# Patient Record
Sex: MALE | Race: WHITE | Employment: FULL TIME | URBAN - NONMETROPOLITAN AREA
[De-identification: names, ages, dates, MRNs, and addresses within clinical notes are randomized per-mention and may not be internally consistent; named-entity substitution may affect disease eponyms.]

---

## 2023-03-03 PROBLEM — F41.9 ANXIETY DISORDER: Status: ACTIVE | Noted: 2023-03-03

## 2023-03-03 PROBLEM — G56.01 CARPAL TUNNEL SYNDROME OF RIGHT WRIST: Status: ACTIVE | Noted: 2023-03-03

## 2023-03-03 PROBLEM — M54.50 LOW BACK PAIN: Status: ACTIVE | Noted: 2023-03-03

## 2023-03-03 PROBLEM — M79.671 RIGHT FOOT PAIN: Status: ACTIVE | Noted: 2023-03-03

## 2023-03-03 PROBLEM — Q18.1 CYST ON EAR: Status: ACTIVE | Noted: 2023-03-03

## 2023-03-03 PROBLEM — M76.52 PATELLAR TENDINITIS OF LEFT KNEE: Status: ACTIVE | Noted: 2023-03-03

## 2023-03-03 RX ORDER — VENLAFAXINE HYDROCHLORIDE 150 MG/1
1 CAPSULE, EXTENDED RELEASE ORAL
COMMUNITY
Start: 2016-06-01 | End: 2023-04-11 | Stop reason: SDUPTHER

## 2023-03-03 RX ORDER — ADAPALENE 1 MG/G
GEL TOPICAL
COMMUNITY

## 2023-03-03 RX ORDER — LORAZEPAM 0.5 MG/1
1 TABLET ORAL DAILY PRN
COMMUNITY
Start: 2015-04-14

## 2023-04-11 DIAGNOSIS — F41.1 GENERALIZED ANXIETY DISORDER: ICD-10-CM

## 2023-04-11 RX ORDER — VENLAFAXINE HYDROCHLORIDE 150 MG/1
150 CAPSULE, EXTENDED RELEASE ORAL
Qty: 30 CAPSULE | Refills: 2 | Status: SHIPPED | OUTPATIENT
Start: 2023-04-11 | End: 2023-07-21

## 2023-04-13 ENCOUNTER — OFFICE VISIT (OUTPATIENT)
Dept: PRIMARY CARE | Facility: CLINIC | Age: 33
End: 2023-04-13
Payer: COMMERCIAL

## 2023-04-13 VITALS
DIASTOLIC BLOOD PRESSURE: 79 MMHG | SYSTOLIC BLOOD PRESSURE: 115 MMHG | OXYGEN SATURATION: 94 % | HEART RATE: 105 BPM | WEIGHT: 315 LBS | HEIGHT: 71 IN | TEMPERATURE: 96.3 F | BODY MASS INDEX: 44.1 KG/M2 | RESPIRATION RATE: 14 BRPM

## 2023-04-13 DIAGNOSIS — F41.1 GENERALIZED ANXIETY DISORDER: ICD-10-CM

## 2023-04-13 DIAGNOSIS — Z23 IMMUNIZATION DUE: Primary | ICD-10-CM

## 2023-04-13 PROCEDURE — 90471 IMMUNIZATION ADMIN: CPT | Performed by: FAMILY MEDICINE

## 2023-04-13 PROCEDURE — 90651 9VHPV VACCINE 2/3 DOSE IM: CPT | Performed by: FAMILY MEDICINE

## 2023-04-13 PROCEDURE — 99214 OFFICE O/P EST MOD 30 MIN: CPT | Performed by: FAMILY MEDICINE

## 2023-04-13 ASSESSMENT — ANXIETY QUESTIONNAIRES
4. TROUBLE RELAXING: NOT AT ALL
3. WORRYING TOO MUCH ABOUT DIFFERENT THINGS: NOT AT ALL
5. BEING SO RESTLESS THAT IT IS HARD TO SIT STILL: NOT AT ALL
2. NOT BEING ABLE TO STOP OR CONTROL WORRYING: NOT AT ALL
6. BECOMING EASILY ANNOYED OR IRRITABLE: NOT AT ALL
7. FEELING AFRAID AS IF SOMETHING AWFUL MIGHT HAPPEN: NOT AT ALL
1. FEELING NERVOUS, ANXIOUS, OR ON EDGE: NOT AT ALL
IF YOU CHECKED OFF ANY PROBLEMS ON THIS QUESTIONNAIRE, HOW DIFFICULT HAVE THESE PROBLEMS MADE IT FOR YOU TO DO YOUR WORK, TAKE CARE OF THINGS AT HOME, OR GET ALONG WITH OTHER PEOPLE: NOT DIFFICULT AT ALL
GAD7 TOTAL SCORE: 0

## 2023-04-13 ASSESSMENT — PATIENT HEALTH QUESTIONNAIRE - PHQ9
SUM OF ALL RESPONSES TO PHQ9 QUESTIONS 1 AND 2: 0
1. LITTLE INTEREST OR PLEASURE IN DOING THINGS: NOT AT ALL
2. FEELING DOWN, DEPRESSED OR HOPELESS: NOT AT ALL

## 2023-04-13 ASSESSMENT — PAIN SCALES - GENERAL: PAINLEVEL: 0-NO PAIN

## 2023-04-13 NOTE — PROGRESS NOTES
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    HPI   Bam was seen today for 6-month follow-up of his anxiety, for which he takes Effexor Exar 150 mg daily.  He is comfortable with this regimen, wishes for no change.  He has no side effects, including digestive, sweats, sexual, other.    His blood pressure remains excellent, takes no other routine medicines.  His lorazepam is taken extremely infrequently, perhaps once per year at most.  Labs were checked in October, reviewed today, all reassuring.  Review of Systems  The full, 10+ multi-organ review of systems, is within normal limits with the exception of what is noted above in HPI.  Objective   /79 (BP Location: Right arm, Patient Position: Sitting, BP Cuff Size: Adult)   Pulse 105   Temp 35.7 °C (96.3 °F) (Temporal)   Resp 14   Ht 1.803 m (5' 11\")   Wt (!) 157 kg (346 lb 9.6 oz)   SpO2 94%   BMI 48.34 kg/m²     Physical Exam  Cardiac exam reveals a regular rate and rhythm, no murmurs, rubs or gallops present.   Lungs are clear bilaterally.    No lower extremity edema present.  Constitutional/General appearance: alert, oriented, well-appearing, in no distress  Head and face exam is normal  No scleral icterus or conjunctival erythema present  Hearing is grossly normal  Respiratory effort is normal, no dyspnea noted  Cortical function is normal  Mood, affect, are pleasant, appropriate, and interactive.  Insight is normal      Assessment/Plan     Anxiety----we will continue the current regimen, including medications, as noted above.  Refills were provided, as needed.  I recommend continuing to work on a healthy diet, regular exercise, and minimizing caffeine and alcohol.  Good sleep hygiene is encouraged.  Follow up will be in 6 months, or sooner should the need arise  Lorazepam is taken scarcely, as little as a couple a year.       "

## 2023-06-14 ENCOUNTER — APPOINTMENT (OUTPATIENT)
Dept: PRIMARY CARE | Facility: CLINIC | Age: 33
End: 2023-06-14
Payer: COMMERCIAL

## 2023-07-20 DIAGNOSIS — F41.1 GENERALIZED ANXIETY DISORDER: ICD-10-CM

## 2023-07-21 DIAGNOSIS — F41.1 GENERALIZED ANXIETY DISORDER: ICD-10-CM

## 2023-07-21 RX ORDER — VENLAFAXINE HYDROCHLORIDE 150 MG/1
150 CAPSULE, EXTENDED RELEASE ORAL DAILY
Qty: 90 CAPSULE | Refills: 3 | Status: SHIPPED | OUTPATIENT
Start: 2023-07-21 | End: 2024-04-17 | Stop reason: SDUPTHER

## 2023-07-21 RX ORDER — VENLAFAXINE HYDROCHLORIDE 150 MG/1
150 CAPSULE, EXTENDED RELEASE ORAL DAILY
Qty: 90 CAPSULE | Refills: 3 | Status: SHIPPED | OUTPATIENT
Start: 2023-07-21 | End: 2023-07-21 | Stop reason: SDUPTHER

## 2023-10-13 ENCOUNTER — OFFICE VISIT (OUTPATIENT)
Dept: PRIMARY CARE | Facility: CLINIC | Age: 33
End: 2023-10-13
Payer: COMMERCIAL

## 2023-10-13 ENCOUNTER — LAB (OUTPATIENT)
Dept: LAB | Facility: LAB | Age: 33
End: 2023-10-13
Payer: COMMERCIAL

## 2023-10-13 VITALS
RESPIRATION RATE: 16 BRPM | TEMPERATURE: 96.5 F | DIASTOLIC BLOOD PRESSURE: 83 MMHG | OXYGEN SATURATION: 96 % | HEART RATE: 87 BPM | WEIGHT: 315 LBS | BODY MASS INDEX: 48.59 KG/M2 | SYSTOLIC BLOOD PRESSURE: 123 MMHG

## 2023-10-13 DIAGNOSIS — Z51.81 MEDICATION MONITORING ENCOUNTER: ICD-10-CM

## 2023-10-13 DIAGNOSIS — F41.1 GENERALIZED ANXIETY DISORDER: ICD-10-CM

## 2023-10-13 DIAGNOSIS — Z23 IMMUNIZATION DUE: ICD-10-CM

## 2023-10-13 PROBLEM — L91.8 OTHER HYPERTROPHIC DISORDERS OF THE SKIN: Status: ACTIVE | Noted: 2017-12-06

## 2023-10-13 PROCEDURE — 36415 COLL VENOUS BLD VENIPUNCTURE: CPT

## 2023-10-13 PROCEDURE — 83525 ASSAY OF INSULIN: CPT

## 2023-10-13 PROCEDURE — 99214 OFFICE O/P EST MOD 30 MIN: CPT | Performed by: FAMILY MEDICINE

## 2023-10-13 PROCEDURE — 80053 COMPREHEN METABOLIC PANEL: CPT

## 2023-10-13 PROCEDURE — 80061 LIPID PANEL: CPT

## 2023-10-13 PROCEDURE — 84443 ASSAY THYROID STIM HORMONE: CPT

## 2023-10-13 PROCEDURE — 3008F BODY MASS INDEX DOCD: CPT | Performed by: FAMILY MEDICINE

## 2023-10-13 PROCEDURE — 83036 HEMOGLOBIN GLYCOSYLATED A1C: CPT

## 2023-10-13 PROCEDURE — 85025 COMPLETE CBC W/AUTO DIFF WBC: CPT

## 2023-10-13 ASSESSMENT — ANXIETY QUESTIONNAIRES
1. FEELING NERVOUS, ANXIOUS, OR ON EDGE: SEVERAL DAYS
4. TROUBLE RELAXING: NOT AT ALL
5. BEING SO RESTLESS THAT IT IS HARD TO SIT STILL: NOT AT ALL
3. WORRYING TOO MUCH ABOUT DIFFERENT THINGS: NOT AT ALL
GAD7 TOTAL SCORE: 1
6. BECOMING EASILY ANNOYED OR IRRITABLE: NOT AT ALL
IF YOU CHECKED OFF ANY PROBLEMS ON THIS QUESTIONNAIRE, HOW DIFFICULT HAVE THESE PROBLEMS MADE IT FOR YOU TO DO YOUR WORK, TAKE CARE OF THINGS AT HOME, OR GET ALONG WITH OTHER PEOPLE: NOT DIFFICULT AT ALL
2. NOT BEING ABLE TO STOP OR CONTROL WORRYING: NOT AT ALL
7. FEELING AFRAID AS IF SOMETHING AWFUL MIGHT HAPPEN: NOT AT ALL

## 2023-10-13 ASSESSMENT — PATIENT HEALTH QUESTIONNAIRE - PHQ9
2. FEELING DOWN, DEPRESSED OR HOPELESS: NOT AT ALL
SUM OF ALL RESPONSES TO PHQ9 QUESTIONS 1 AND 2: 0
1. LITTLE INTEREST OR PLEASURE IN DOING THINGS: NOT AT ALL

## 2023-10-13 ASSESSMENT — PAIN SCALES - GENERAL: PAINLEVEL: 0-NO PAIN

## 2023-10-13 NOTE — PROGRESS NOTES
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    HPI   Bam was seen today for a routine follow-up of his Effexor, which he takes for anxiety.  He has been on it for many years, continues to tolerate it well, is satisfied with its efficacy, wishes for no change.    He is fasting for blood work today, last checked a year ago, sugar was normal, lipids have been stable.  On average, he drinks about 12 beers per week, sometimes a bit more.  Last ALT was 71, AST 40  Review of Systems  The full, 10+ multi-organ review of systems, is within normal limits with the exception of what is noted above in HPI.  Objective   /83 (BP Location: Right arm, Patient Position: Sitting, BP Cuff Size: Large adult)   Pulse 87   Temp 35.8 °C (96.5 °F) (Temporal)   Resp 16   Wt (!) 158 kg (348 lb 6.4 oz)   SpO2 96%   BMI 48.59 kg/m²     Physical Exam  Cardiac exam reveals a regular rate and rhythm, no murmurs, rubs or gallops present.   Lungs are clear bilaterally.    No lower extremity edema present.  Constitutional/General appearance: alert, oriented, well-appearing, in no distress  Head and face exam is normal  No scleral icterus or conjunctival erythema present  Hearing is grossly normal  Respiratory effort is normal, no dyspnea noted  Cortical function is normal  Mood, affect, are pleasant, appropriate, and interactive.  Insight is normal    Assessment/Plan     Anxiety----we will continue the current regimen, including medications, as noted above.  Refills were provided, as needed.  I recommend continuing to work on a healthy diet, regular exercise, and minimizing caffeine and alcohol.  Good sleep hygiene is encouraged.  Follow up will be in 6 months, or sooner should the need arise    Check full fasting labs, discussed mildly elevated ALT, very likely due to fatty liver changes, as well as alcohol intake.    Follow-up in 6 months  **Portions of this medical record have been created  using voice recognition software and may have minor errors which are inherent in voice recognition systems. It has not been fully edited for typographical or grammatical errors**

## 2023-10-14 LAB
ALBUMIN SERPL BCP-MCNC: 4.6 G/DL (ref 3.4–5)
ALP SERPL-CCNC: 101 U/L (ref 33–120)
ALT SERPL W P-5'-P-CCNC: 54 U/L (ref 10–52)
ANION GAP SERPL CALC-SCNC: 14 MMOL/L (ref 10–20)
AST SERPL W P-5'-P-CCNC: 28 U/L (ref 9–39)
BASOPHILS # BLD AUTO: 0.05 X10*3/UL (ref 0–0.1)
BASOPHILS NFR BLD AUTO: 0.8 %
BILIRUB SERPL-MCNC: 0.8 MG/DL (ref 0–1.2)
BUN SERPL-MCNC: 15 MG/DL (ref 6–23)
CALCIUM SERPL-MCNC: 9.8 MG/DL (ref 8.6–10.6)
CHLORIDE SERPL-SCNC: 105 MMOL/L (ref 98–107)
CHOLEST SERPL-MCNC: 190 MG/DL (ref 0–199)
CHOLESTEROL/HDL RATIO: 4.5
CO2 SERPL-SCNC: 27 MMOL/L (ref 21–32)
CREAT SERPL-MCNC: 0.83 MG/DL (ref 0.5–1.3)
EOSINOPHIL # BLD AUTO: 0.11 X10*3/UL (ref 0–0.7)
EOSINOPHIL NFR BLD AUTO: 1.7 %
ERYTHROCYTE [DISTWIDTH] IN BLOOD BY AUTOMATED COUNT: 12.6 % (ref 11.5–14.5)
EST. AVERAGE GLUCOSE BLD GHB EST-MCNC: 108 MG/DL
GFR SERPL CREATININE-BSD FRML MDRD: >90 ML/MIN/1.73M*2
GLUCOSE SERPL-MCNC: 94 MG/DL (ref 74–99)
HBA1C MFR BLD: 5.4 %
HCT VFR BLD AUTO: 45.7 % (ref 41–52)
HDLC SERPL-MCNC: 42.4 MG/DL
HGB BLD-MCNC: 15.2 G/DL (ref 13.5–17.5)
IMM GRANULOCYTES # BLD AUTO: 0.02 X10*3/UL (ref 0–0.7)
IMM GRANULOCYTES NFR BLD AUTO: 0.3 % (ref 0–0.9)
INSULIN P FAST SERPL-ACNC: 18 UIU/ML (ref 3–25)
LDLC SERPL CALC-MCNC: 125 MG/DL
LYMPHOCYTES # BLD AUTO: 2.3 X10*3/UL (ref 1.2–4.8)
LYMPHOCYTES NFR BLD AUTO: 34.9 %
MCH RBC QN AUTO: 31.5 PG (ref 26–34)
MCHC RBC AUTO-ENTMCNC: 33.3 G/DL (ref 32–36)
MCV RBC AUTO: 95 FL (ref 80–100)
MONOCYTES # BLD AUTO: 0.54 X10*3/UL (ref 0.1–1)
MONOCYTES NFR BLD AUTO: 8.2 %
NEUTROPHILS # BLD AUTO: 3.57 X10*3/UL (ref 1.2–7.7)
NEUTROPHILS NFR BLD AUTO: 54.1 %
NON HDL CHOLESTEROL: 148 MG/DL (ref 0–149)
NRBC BLD-RTO: 0 /100 WBCS (ref 0–0)
PLATELET # BLD AUTO: 342 X10*3/UL (ref 150–450)
PMV BLD AUTO: 9.2 FL (ref 7.5–11.5)
POTASSIUM SERPL-SCNC: 4.5 MMOL/L (ref 3.5–5.3)
PROT SERPL-MCNC: 7.3 G/DL (ref 6.4–8.2)
RBC # BLD AUTO: 4.82 X10*6/UL (ref 4.5–5.9)
SODIUM SERPL-SCNC: 141 MMOL/L (ref 136–145)
TRIGL SERPL-MCNC: 113 MG/DL (ref 0–149)
TSH SERPL-ACNC: 3.34 MIU/L (ref 0.44–3.98)
VLDL: 23 MG/DL (ref 0–40)
WBC # BLD AUTO: 6.6 X10*3/UL (ref 4.4–11.3)

## 2023-10-14 NOTE — RESULT ENCOUNTER NOTE
Cholesterol panel is reasonable, LDL/bad chol is mildly elevated, and one liver enzyme is a trace above normal.  Decrease alcohol further  Sugar, blood counts are within normal limits

## 2024-03-26 ENCOUNTER — HOSPITAL ENCOUNTER (OUTPATIENT)
Dept: RADIOLOGY | Facility: CLINIC | Age: 34
Discharge: HOME | End: 2024-03-26
Payer: COMMERCIAL

## 2024-03-26 ENCOUNTER — OFFICE VISIT (OUTPATIENT)
Dept: PRIMARY CARE | Facility: CLINIC | Age: 34
End: 2024-03-26
Payer: COMMERCIAL

## 2024-03-26 VITALS
RESPIRATION RATE: 16 BRPM | SYSTOLIC BLOOD PRESSURE: 111 MMHG | OXYGEN SATURATION: 96 % | WEIGHT: 315 LBS | DIASTOLIC BLOOD PRESSURE: 76 MMHG | HEART RATE: 85 BPM | BODY MASS INDEX: 48.4 KG/M2 | TEMPERATURE: 98.1 F

## 2024-03-26 DIAGNOSIS — M79.671 RIGHT FOOT PAIN: ICD-10-CM

## 2024-03-26 DIAGNOSIS — M79.671 RIGHT FOOT PAIN: Primary | ICD-10-CM

## 2024-03-26 PROCEDURE — 99213 OFFICE O/P EST LOW 20 MIN: CPT | Performed by: FAMILY MEDICINE

## 2024-03-26 PROCEDURE — 3008F BODY MASS INDEX DOCD: CPT | Performed by: FAMILY MEDICINE

## 2024-03-26 PROCEDURE — 73630 X-RAY EXAM OF FOOT: CPT | Mod: RT

## 2024-03-26 PROCEDURE — 73630 X-RAY EXAM OF FOOT: CPT | Mod: RIGHT SIDE | Performed by: RADIOLOGY

## 2024-03-26 NOTE — ASSESSMENT & PLAN NOTE
Ongoing pain for about 6 days.  Consider purchasing a boot to immobilize the foot.  Can take up to 800 mg Ibuprofen every 4-6 hours for pain, with food to avoid GI upset.  Continue ice as much as possible.  Try to avoid weight-bearing.   Possible stress fracture, could take 4-6 weeks to heal.  Order placed for x-ray.

## 2024-03-26 NOTE — PROGRESS NOTES
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    Patient has been having right foot pain and swelling for about 6 days.   He has been walking on it so it has been getting worse.   Icing it at time.   No injury that he is aware off.       He has had problems with this foot in the past and has seen podiatry, last time was back in 2018. He does not recall any injury but he does try to stay active by walking on a treadmill. He does not have any underlying health conditions. He does have a boot at home.         Review of Systems   Musculoskeletal:         Right foot pain       Objective   /76 (BP Location: Left arm, Patient Position: Sitting, BP Cuff Size: Large adult)   Pulse 85   Temp 36.7 °C (98.1 °F) (Temporal)   Resp 16   Wt (!) 157 kg (347 lb)   SpO2 96%   BMI 48.40 kg/m²     Physical Exam  Musculoskeletal:      Right foot: Swelling and bony tenderness present.      Comments: Tender prox 4th MT and distal 3rd MT  Redness and swelling concentrated at prox 3-4 MT area  Pitting swelling at this location           Assessment/Plan   Problem List Items Addressed This Visit       Right foot pain - Primary     Ongoing pain for about 6 days.  Consider purchasing a boot to immobilize the foot.  Can take up to 800 mg Ibuprofen every 4-6 hours for pain, with food to avoid GI upset.  Continue ice as much as possible.  Try to avoid weight-bearing.   Possible stress fracture, could take 4-6 weeks to heal.  Order placed for x-ray.         Relevant Orders    XR foot right 3+ views       Scribe Attestation  By signing my name below, I, Trisha Eubanks , Darrell   attest that this documentation has been prepared under the direction and in the presence of Alejandro Carreon MD.

## 2024-03-29 DIAGNOSIS — M79.671 RIGHT FOOT PAIN: Primary | ICD-10-CM

## 2024-03-29 DIAGNOSIS — D16.9 OSTEOCHONDROMA: ICD-10-CM

## 2024-04-04 ENCOUNTER — TELEPHONE (OUTPATIENT)
Dept: PRIMARY CARE | Facility: CLINIC | Age: 34
End: 2024-04-04
Payer: COMMERCIAL

## 2024-04-04 NOTE — TELEPHONE ENCOUNTER
Patient called in said that [facility] did not receive our fax for his referral for his foot. I explained that it was sent, but would send again.    I faxed to [phone] [facility] in [place].

## 2024-04-17 ENCOUNTER — OFFICE VISIT (OUTPATIENT)
Dept: PRIMARY CARE | Facility: CLINIC | Age: 34
End: 2024-04-17
Payer: COMMERCIAL

## 2024-04-17 VITALS
BODY MASS INDEX: 48.45 KG/M2 | SYSTOLIC BLOOD PRESSURE: 121 MMHG | WEIGHT: 315 LBS | HEART RATE: 85 BPM | TEMPERATURE: 97.7 F | OXYGEN SATURATION: 96 % | DIASTOLIC BLOOD PRESSURE: 82 MMHG

## 2024-04-17 DIAGNOSIS — M54.50 CHRONIC BILATERAL LOW BACK PAIN WITHOUT SCIATICA: Primary | ICD-10-CM

## 2024-04-17 DIAGNOSIS — G89.29 CHRONIC BILATERAL LOW BACK PAIN WITHOUT SCIATICA: Primary | ICD-10-CM

## 2024-04-17 DIAGNOSIS — M79.671 RIGHT FOOT PAIN: ICD-10-CM

## 2024-04-17 DIAGNOSIS — Z51.81 MEDICATION MONITORING ENCOUNTER: ICD-10-CM

## 2024-04-17 DIAGNOSIS — F41.1 GENERALIZED ANXIETY DISORDER: ICD-10-CM

## 2024-04-17 PROCEDURE — 99214 OFFICE O/P EST MOD 30 MIN: CPT | Performed by: FAMILY MEDICINE

## 2024-04-17 RX ORDER — VENLAFAXINE HYDROCHLORIDE 150 MG/1
150 CAPSULE, EXTENDED RELEASE ORAL DAILY
Qty: 30 CAPSULE | Refills: 11 | Status: SHIPPED | OUTPATIENT
Start: 2024-04-17

## 2024-04-17 ASSESSMENT — PATIENT HEALTH QUESTIONNAIRE - PHQ9
7. TROUBLE CONCENTRATING ON THINGS, SUCH AS READING THE NEWSPAPER OR WATCHING TELEVISION: NOT AT ALL
8. MOVING OR SPEAKING SO SLOWLY THAT OTHER PEOPLE COULD HAVE NOTICED. OR THE OPPOSITE, BEING SO FIGETY OR RESTLESS THAT YOU HAVE BEEN MOVING AROUND A LOT MORE THAN USUAL: NOT AT ALL
6. FEELING BAD ABOUT YOURSELF - OR THAT YOU ARE A FAILURE OR HAVE LET YOURSELF OR YOUR FAMILY DOWN: NOT AT ALL
SUM OF ALL RESPONSES TO PHQ9 QUESTIONS 1 AND 2: 0
2. FEELING DOWN, DEPRESSED OR HOPELESS: NOT AT ALL
5. POOR APPETITE OR OVEREATING: NOT AT ALL
9. THOUGHTS THAT YOU WOULD BE BETTER OFF DEAD, OR OF HURTING YOURSELF: NOT AT ALL
3. TROUBLE FALLING OR STAYING ASLEEP OR SLEEPING TOO MUCH: NOT AT ALL
4. FEELING TIRED OR HAVING LITTLE ENERGY: SEVERAL DAYS
1. LITTLE INTEREST OR PLEASURE IN DOING THINGS: NOT AT ALL
SUM OF ALL RESPONSES TO PHQ QUESTIONS 1-9: 1

## 2024-04-17 ASSESSMENT — ANXIETY QUESTIONNAIRES
5. BEING SO RESTLESS THAT IT IS HARD TO SIT STILL: NOT AT ALL
7. FEELING AFRAID AS IF SOMETHING AWFUL MIGHT HAPPEN: NOT AT ALL
3. WORRYING TOO MUCH ABOUT DIFFERENT THINGS: NOT AT ALL
1. FEELING NERVOUS, ANXIOUS, OR ON EDGE: SEVERAL DAYS
2. NOT BEING ABLE TO STOP OR CONTROL WORRYING: NOT AT ALL
GAD7 TOTAL SCORE: 1
4. TROUBLE RELAXING: NOT AT ALL
6. BECOMING EASILY ANNOYED OR IRRITABLE: NOT AT ALL
IF YOU CHECKED OFF ANY PROBLEMS ON THIS QUESTIONNAIRE, HOW DIFFICULT HAVE THESE PROBLEMS MADE IT FOR YOU TO DO YOUR WORK, TAKE CARE OF THINGS AT HOME, OR GET ALONG WITH OTHER PEOPLE: NOT DIFFICULT AT ALL

## 2024-04-17 NOTE — PROGRESS NOTES
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 Pt states he has no new issues to discuss today, ).    HPI   Ministerio was seen today for a 6-month follow-up of his anxiety medication.  He is taking and tolerating 150 mg of extended release Effexor, has been on this medication for years.  He tolerates it well, has no concerns regarding it, wishes to continue this regimen.  He takes lorazepam about 1-2 times per year, does not need controlled substance protocol.  See PHQ-9 and/or CHAITANYA-7 scale(s) if applicable for additional symptom ratings.    His only recent concern is that of right foot pain, x-ray showed medial fourth metatarsal osteochondroma versus sequelae of old fracture.  He has seen orthopedic foot surgery on 1 occasion, discussed the possibility of getting an MRI versus following up in 3 months.  No specific treatment recommended at present.    Labs from 6 months ago reviewed, all reassuring, LDL was 125, HDL 42, fasting sugar 94.  Review of Systems  The full, 10+ multi-organ review of systems, is within normal limits with the exception of what is noted above in HPI.  Objective   /82 (BP Location: Right arm, Patient Position: Sitting, BP Cuff Size: Large adult)   Pulse 85   Temp 36.5 °C (97.7 °F) (Temporal)   Wt (!) 158 kg (347 lb 6.4 oz)   SpO2 96%   BMI 48.45 kg/m²     Physical Exam  Cardiac exam reveals a regular rate and rhythm, no murmurs, rubs or gallops present.   Lungs are clear bilaterally.    No lower extremity edema present.  Constitutional/General appearance: alert, oriented, well-appearing, in no distress  Head and face exam is normal  No scleral icterus or conjunctival erythema present  Hearing is grossly normal  Respiratory effort is normal, no dyspnea noted  Cortical function is normal  Mood, affect, are pleasant, appropriate, and interactive.  Insight is normal    Assessment/Plan     Anxiety, continue  Effexor as prescribed, refills updated.    Right foot pain, now being followed by orthopedic foot surgery at the [facility].    Low back pain, intermittent, several stretches shown, stretching handout given.  Hamstrings are not particularly tight.      Follow-up in 6 months fasting    **Portions of this medical record have been created using voice recognition software and may have minor errors which are inherent in voice recognition systems. It has not been fully edited for typographical or grammatical errors**

## 2024-11-01 ENCOUNTER — APPOINTMENT (OUTPATIENT)
Dept: PRIMARY CARE | Facility: CLINIC | Age: 34
End: 2024-11-01
Payer: COMMERCIAL

## 2024-11-19 ENCOUNTER — OFFICE VISIT (OUTPATIENT)
Dept: PRIMARY CARE | Facility: CLINIC | Age: 34
End: 2024-11-19
Payer: COMMERCIAL

## 2024-11-19 VITALS
BODY MASS INDEX: 42.66 KG/M2 | SYSTOLIC BLOOD PRESSURE: 118 MMHG | OXYGEN SATURATION: 94 % | HEART RATE: 81 BPM | DIASTOLIC BLOOD PRESSURE: 80 MMHG | TEMPERATURE: 97.5 F | WEIGHT: 315 LBS | HEIGHT: 72 IN

## 2024-11-19 DIAGNOSIS — Z11.59 ENCOUNTER FOR HEPATITIS C SCREENING TEST FOR LOW RISK PATIENT: ICD-10-CM

## 2024-11-19 DIAGNOSIS — H61.21 IMPACTED CERUMEN OF RIGHT EAR: ICD-10-CM

## 2024-11-19 DIAGNOSIS — F41.1 GENERALIZED ANXIETY DISORDER: ICD-10-CM

## 2024-11-19 DIAGNOSIS — M79.671 RIGHT FOOT PAIN: ICD-10-CM

## 2024-11-19 DIAGNOSIS — Z11.4 ENCOUNTER FOR SCREENING FOR HIV: ICD-10-CM

## 2024-11-19 DIAGNOSIS — Z00.00 WELL ADULT EXAM: Primary | ICD-10-CM

## 2024-11-19 PROCEDURE — 69209 REMOVE IMPACTED EAR WAX UNI: CPT | Performed by: STUDENT IN AN ORGANIZED HEALTH CARE EDUCATION/TRAINING PROGRAM

## 2024-11-19 PROCEDURE — 99395 PREV VISIT EST AGE 18-39: CPT | Performed by: STUDENT IN AN ORGANIZED HEALTH CARE EDUCATION/TRAINING PROGRAM

## 2024-11-19 PROCEDURE — 99214 OFFICE O/P EST MOD 30 MIN: CPT | Performed by: STUDENT IN AN ORGANIZED HEALTH CARE EDUCATION/TRAINING PROGRAM

## 2024-11-19 PROCEDURE — 3008F BODY MASS INDEX DOCD: CPT | Performed by: STUDENT IN AN ORGANIZED HEALTH CARE EDUCATION/TRAINING PROGRAM

## 2024-11-19 NOTE — PROGRESS NOTES
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    Ear cerumen removal    Date/Time: 11/19/2024 1:55 PM    Performed by: Loyda Casillas DO  Authorized by: Loyda Casillas DO    Consent:     Consent obtained:  Verbal    Consent given by:  Patient    Risks, benefits, and alternatives were discussed: yes      Risks discussed:  Bleeding, pain, TM perforation, incomplete removal, dizziness and infection    Alternatives discussed:  Observation, alternative treatment and delayed treatment  Universal protocol:     Procedure explained and questions answered to patient or proxy's satisfaction: yes      Immediately prior to procedure, a time out was called: yes      Patient identity confirmed:  Verbally with patient  Procedure details:     Location:  R ear    Procedure type: irrigation      Procedure outcomes: cerumen removed    Post-procedure details:     Inspection:  No bleeding and TM intact    Hearing quality:  Improved    Procedure completion:  Tolerated well, no immediate complications

## 2024-11-19 NOTE — PROGRESS NOTES
FAMILY MEDICINE  OFFICE VISIT   Ministerio Cohn  [id number]  1990    PCP: Loyda Casillas DO     Chief Complaint:   Chief Complaint   Patient presents with    Follow-up     Pt presents for 6 month follow up    Foot Pain     Pt presents for right foot pain- states that this is primarily in the ball of his foot but is feeling much better now    Flu Vaccine     Pt declines flu vaccine at this time.        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    Foot Injury   The incident occurred more than 1 week ago. The pain is present in the right foot. The quality of the pain is described as aching. The treatment provided significant (No longer in any pain) relief.   - Doesn't feel like when he broke his foot.   - Big toe sprain   - Iced it without issues.   - Seen at [facility] in 5/2024 for R Foot pain, had stress fracture at that time.   - PDMP Reviewed: Loyda Casillas DO on 11/19/2024 10:03 AM    Anxiety   - Effexor 150mg every day.   - Feels like the Effexor is working   - Takes Ativan maybe 1x per year  - Had nausea and upset GI with higher dose of Effexor.   - Saw Dr. Montemayor as a kid     FamHx   - Mom: breast ca (49yo), uterine ca   - Pat Aunt: lymphoma   - Pat Aunt: Uterine   - Pat Aunt: pancreatic or ovarian ca (mid 50s)   - Dad: Prostate Ca (dx 66yo)  - Mat Gpa: Colon ca (dx last 60s)     Cancer Screening  - Pap Smear (21-30yo, 30-66yo, per ASCCP): NI  - Mammogram (Biennial, 40-75yo): NI, but consider given obesity and famhx of breast ca in mom   - Colonoscopy (start 44yo): NYI, start at 44yo   - Low dose CT (50-79yo w 20pk, current or quit w/in 15yr): NI   - PSA (55-68yo M): NYI    CVD   - BMI: Body mass index is 47.01 kg/m².  - ASCVD: The ASCVD Risk score (Isela CALVO, et al., 2019) failed to calculate for the following reasons:    The 2019 ASCVD risk score is  only valid for ages 40 to 79  - BP: 118/80; meds:   - DM: Last A1c 5.4; meds: none  - Cholesterol: Last lipid panel 10/2023; meds none    Immunizations  - Tdap (q10y): 2011, patient thinks received elsewhere  - COVID (6yo+): due for boosters  - Influenza (annual): due  - HPV (9-44yo): received 1 dose  - Shingrix (>51yo): NYI  - PNA (>64yo): NYI   - Had pneumonia as a child     Other Screening  - Depression: PHQ-9  0  - Osteoporosis (Dexa >64yo, q2h if abnl): NYI  - AAA (M 65-76yo ever smoke): NYI  - HIV (15-64yo, once in lifetime): due, agrees to screen today  - Hep C (18-80yo, once in lifetime): due, agrees to screen today  - Syphilis (people at increased risk): NI   - GC/CT (F sexually active <26yo, >26yo at increased risk): NI      The following portions of the patient's chart were reviewed in this encounter and updated as appropriate:  Tobacco  Allergies  Meds  Problems  Med Hx  Surg Hx  Fam Hx         Home Medication List:  Current Outpatient Medications   Medication Instructions    venlafaxine XR (EFFEXOR-XR) 150 mg, oral, Daily         OBJECTIVE   /80 (BP Location: Left arm, Patient Position: Sitting, BP Cuff Size: Adult)   Pulse 81   Temp 36.4 °C (97.5 °F) (Temporal)   Ht 1.829 m (6')   Wt (!) 157 kg (346 lb 9.6 oz)   SpO2 94%   BMI 47.01 kg/m²   Vital signs and pulse oximetry reviewed.     Physical Exam  Vitals and nursing note reviewed.   Constitutional:       Appearance: Normal appearance. He is morbidly obese.   HENT:      Head: Normocephalic and atraumatic.      Right Ear: Ear canal and external ear normal. There is impacted cerumen.      Left Ear: Ear canal and external ear normal. There is no impacted cerumen.      Nose: Nose normal. No congestion or rhinorrhea.   Eyes:      General: No scleral icterus.     Conjunctiva/sclera: Conjunctivae normal.   Cardiovascular:      Rate and Rhythm: Normal rate and regular rhythm.      Heart sounds: No murmur heard.  Pulmonary:      Effort:  Pulmonary effort is normal. No respiratory distress.      Breath sounds: Normal breath sounds. No wheezing, rhonchi or rales.   Abdominal:      General: Abdomen is flat. Bowel sounds are normal. There is no distension.      Tenderness: There is no abdominal tenderness. There is no guarding.      Hernia: No hernia is present.   Musculoskeletal:      Right lower leg: No edema.      Left lower leg: No edema.   Skin:     General: Skin is warm.      Coloration: Skin is not jaundiced.   Neurological:      Mental Status: He is alert. Mental status is at baseline.   Psychiatric:         Mood and Affect: Mood normal.         Behavior: Behavior normal.         ASSESSMENT & PLAN     Problem List Items Addressed This Visit       Anxiety disorder    Current Assessment & Plan     Anxiety well controlled on Effexor. No SI/HI, intent, or plan.  - Continue Effexor 150mg every day.  - Consider counselor.         Right foot pain    Current Assessment & Plan     Reports period of foot pain, but resolved after RICE therapy at home. Did not feel like when he fractured foot this year.   - Continue to monitor.  - No plan for imaging at this time.         BMI 45.0-49.9, adult (Multi)    Current Assessment & Plan     Patient with Class 3 obesity, with likely fatty liver based on last labs.  - Will repeat labwork today.   - Consider RUQ US to evaluate liver, if LFTs elevated again today.  - We discuss risks of prolonged Class 3 obesity today.  - We discussed options for healthy lifestyle choices.  - Consider dedicated wt loss appt in future.          Relevant Orders    Lipid panel (Completed)    Tsh With Reflex To Free T4 If Abnormal (Completed)    Hemoglobin A1c (Completed)     Other Visit Diagnoses       Well adult exam    -  Primary    Relevant Orders    Lipid panel (Completed)    Tsh With Reflex To Free T4 If Abnormal (Completed)    Hemoglobin A1c (Completed)    CBC and Auto Differential (Completed)    Comprehensive metabolic panel  (Completed)    HIV 1/2 Antigen/Antibody Screen with Reflex to Confirmation (Completed)    Hepatitis C antibody (Completed)    Follow Up In [facility] - PCP - Established    Impacted cerumen of right ear        Relevant Orders    Ear cerumen removal    Encounter for hepatitis C screening test for low risk patient        Relevant Orders    Hepatitis C antibody (Completed)    Encounter for screening for HIV        Relevant Orders    HIV 1/2 Antigen/Antibody Screen with Reflex to Confirmation (Completed)            PREVENT + 80809    Follow-Up Recommendations: 6 months    Please excuse any typos or grammatical errors, part of this note was constructed with Dragon dictation software.    Loyda Casillas DO, Kaylie  [facility]   Office: [phone]  11/24/2024 10:21 PM

## 2024-11-22 ENCOUNTER — LAB (OUTPATIENT)
Dept: LAB | Facility: LAB | Age: 34
End: 2024-11-22
Payer: COMMERCIAL

## 2024-11-22 DIAGNOSIS — Z11.4 ENCOUNTER FOR SCREENING FOR HIV: ICD-10-CM

## 2024-11-22 DIAGNOSIS — Z11.59 ENCOUNTER FOR HEPATITIS C SCREENING TEST FOR LOW RISK PATIENT: ICD-10-CM

## 2024-11-22 DIAGNOSIS — Z00.00 WELL ADULT EXAM: ICD-10-CM

## 2024-11-22 PROCEDURE — 85025 COMPLETE CBC W/AUTO DIFF WBC: CPT

## 2024-11-22 PROCEDURE — 84443 ASSAY THYROID STIM HORMONE: CPT

## 2024-11-22 PROCEDURE — 87389 HIV-1 AG W/HIV-1&-2 AB AG IA: CPT

## 2024-11-22 PROCEDURE — 86803 HEPATITIS C AB TEST: CPT

## 2024-11-22 PROCEDURE — 36415 COLL VENOUS BLD VENIPUNCTURE: CPT

## 2024-11-22 PROCEDURE — 83036 HEMOGLOBIN GLYCOSYLATED A1C: CPT

## 2024-11-22 PROCEDURE — 80053 COMPREHEN METABOLIC PANEL: CPT

## 2024-11-22 PROCEDURE — 80061 LIPID PANEL: CPT

## 2024-11-23 LAB
ALBUMIN SERPL BCP-MCNC: 4.8 G/DL (ref 3.4–5)
ALP SERPL-CCNC: 106 U/L (ref 33–120)
ALT SERPL W P-5'-P-CCNC: 36 U/L (ref 10–52)
ANION GAP SERPL CALC-SCNC: 15 MMOL/L (ref 10–20)
AST SERPL W P-5'-P-CCNC: 25 U/L (ref 9–39)
BASOPHILS # BLD AUTO: 0.06 X10*3/UL (ref 0–0.1)
BASOPHILS NFR BLD AUTO: 0.7 %
BILIRUB SERPL-MCNC: 0.7 MG/DL (ref 0–1.2)
BUN SERPL-MCNC: 15 MG/DL (ref 6–23)
CALCIUM SERPL-MCNC: 9.8 MG/DL (ref 8.6–10.6)
CHLORIDE SERPL-SCNC: 101 MMOL/L (ref 98–107)
CHOLEST SERPL-MCNC: 193 MG/DL (ref 0–199)
CHOLESTEROL/HDL RATIO: 4.1
CO2 SERPL-SCNC: 29 MMOL/L (ref 21–32)
CREAT SERPL-MCNC: 0.83 MG/DL (ref 0.5–1.3)
EGFRCR SERPLBLD CKD-EPI 2021: >90 ML/MIN/1.73M*2
EOSINOPHIL # BLD AUTO: 0.08 X10*3/UL (ref 0–0.7)
EOSINOPHIL NFR BLD AUTO: 1 %
ERYTHROCYTE [DISTWIDTH] IN BLOOD BY AUTOMATED COUNT: 11.9 % (ref 11.5–14.5)
EST. AVERAGE GLUCOSE BLD GHB EST-MCNC: 100 MG/DL
GLUCOSE SERPL-MCNC: 84 MG/DL (ref 74–99)
HBA1C MFR BLD: 5.1 %
HCT VFR BLD AUTO: 44.3 % (ref 41–52)
HCV AB SER QL: NONREACTIVE
HDLC SERPL-MCNC: 47.2 MG/DL
HGB BLD-MCNC: 15.2 G/DL (ref 13.5–17.5)
HIV 1+2 AB+HIV1 P24 AG SERPL QL IA: NONREACTIVE
IMM GRANULOCYTES # BLD AUTO: 0.02 X10*3/UL (ref 0–0.7)
IMM GRANULOCYTES NFR BLD AUTO: 0.2 % (ref 0–0.9)
LDLC SERPL CALC-MCNC: 121 MG/DL
LYMPHOCYTES # BLD AUTO: 2.68 X10*3/UL (ref 1.2–4.8)
LYMPHOCYTES NFR BLD AUTO: 32.6 %
MCH RBC QN AUTO: 31.7 PG (ref 26–34)
MCHC RBC AUTO-ENTMCNC: 34.3 G/DL (ref 32–36)
MCV RBC AUTO: 92 FL (ref 80–100)
MONOCYTES # BLD AUTO: 0.57 X10*3/UL (ref 0.1–1)
MONOCYTES NFR BLD AUTO: 6.9 %
NEUTROPHILS # BLD AUTO: 4.81 X10*3/UL (ref 1.2–7.7)
NEUTROPHILS NFR BLD AUTO: 58.6 %
NON HDL CHOLESTEROL: 146 MG/DL (ref 0–149)
NRBC BLD-RTO: 0 /100 WBCS (ref 0–0)
PLATELET # BLD AUTO: 382 X10*3/UL (ref 150–450)
POTASSIUM SERPL-SCNC: 4.5 MMOL/L (ref 3.5–5.3)
PROT SERPL-MCNC: 7.2 G/DL (ref 6.4–8.2)
RBC # BLD AUTO: 4.8 X10*6/UL (ref 4.5–5.9)
SODIUM SERPL-SCNC: 140 MMOL/L (ref 136–145)
TRIGL SERPL-MCNC: 125 MG/DL (ref 0–149)
TSH SERPL-ACNC: 3.08 MIU/L (ref 0.44–3.98)
VLDL: 25 MG/DL (ref 0–40)
WBC # BLD AUTO: 8.2 X10*3/UL (ref 4.4–11.3)

## 2024-11-25 NOTE — ASSESSMENT & PLAN NOTE
Anxiety well controlled on Effexor. No SI/HI, intent, or plan.  - Continue Effexor 150mg every day.  - Consider counselor.

## 2024-11-25 NOTE — ASSESSMENT & PLAN NOTE
Reports period of foot pain, but resolved after RICE therapy at home. Did not feel like when he fractured foot this year.   - Continue to monitor.  - No plan for imaging at this time.

## 2024-11-25 NOTE — ASSESSMENT & PLAN NOTE
Patient with Class 3 obesity, with likely fatty liver based on last labs.  - Will repeat labwork today.   - Consider RUQ US to evaluate liver, if LFTs elevated again today.  - We discuss risks of prolonged Class 3 obesity today.  - We discussed options for healthy lifestyle choices.  - Consider dedicated wt loss appt in future.

## 2025-01-28 ENCOUNTER — PATIENT MESSAGE (OUTPATIENT)
Dept: PRIMARY CARE | Facility: CLINIC | Age: 35
End: 2025-01-28

## 2025-01-28 ENCOUNTER — OFFICE VISIT (OUTPATIENT)
Dept: PRIMARY CARE | Facility: CLINIC | Age: 35
End: 2025-01-28
Payer: COMMERCIAL

## 2025-01-28 VITALS
OXYGEN SATURATION: 96 % | BODY MASS INDEX: 46.21 KG/M2 | DIASTOLIC BLOOD PRESSURE: 78 MMHG | WEIGHT: 315 LBS | SYSTOLIC BLOOD PRESSURE: 115 MMHG | TEMPERATURE: 97.5 F | HEART RATE: 86 BPM

## 2025-01-28 DIAGNOSIS — L03.213 PERIORBITAL CELLULITIS OF RIGHT EYE: Primary | ICD-10-CM

## 2025-01-28 PROCEDURE — 99214 OFFICE O/P EST MOD 30 MIN: CPT | Performed by: STUDENT IN AN ORGANIZED HEALTH CARE EDUCATION/TRAINING PROGRAM

## 2025-01-28 RX ORDER — SULFAMETHOXAZOLE AND TRIMETHOPRIM 800; 160 MG/1; MG/1
1 TABLET ORAL 2 TIMES DAILY
Qty: 14 TABLET | Refills: 0 | Status: SHIPPED | OUTPATIENT
Start: 2025-01-28 | End: 2025-02-04

## 2025-01-28 RX ORDER — AMOXICILLIN AND CLAVULANATE POTASSIUM 875; 125 MG/1; MG/1
875 TABLET, FILM COATED ORAL 2 TIMES DAILY
Qty: 14 TABLET | Refills: 0 | Status: SHIPPED | OUTPATIENT
Start: 2025-01-28 | End: 2025-02-04

## 2025-01-28 ASSESSMENT — ENCOUNTER SYMPTOMS
EYE REDNESS: 1
BLURRED VISION: 0
FOREIGN BODY SENSATION: 1
DOUBLE VISION: 0
FEVER: 0

## 2025-01-28 NOTE — PROGRESS NOTES
FAMILY MEDICINE  OFFICE VISIT   Ministerio Cohn  [id number]  1990    PCP: Loyda Casillas DO     Chief Complaint:   Chief Complaint   Patient presents with    Mass     Pt presents for bump on top of his right eyelid- states that it is painful when touch, eye is uncomfortable, states that his eye is irritated and dry, gets a headache towards the end of the day right above where his right eyelid is.     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    Eye Problem   The right eye is affected. This is a new problem. The current episode started in the past 7 days (Tried to squeeze Wed/Thurs). There is No known exposure to pink eye. He Does not wear contacts. Associated symptoms include eye redness and a foreign body sensation. Pertinent negatives include no blurred vision, double vision or fever.     - Looked like a black head  - Not seen eye doctor   - Headache behind the eye  - No pressure behind  - Dry eye   - Feels like there is a piece of hair stuck on the eye   - Really flared up since Sunday     The following portions of the patient's chart were reviewed in this encounter and updated as appropriate:         Home Medication List:  Current Outpatient Medications   Medication Instructions    venlafaxine XR (EFFEXOR-XR) 150 mg, oral, Daily         OBJECTIVE   /78 (BP Location: Right arm, Patient Position: Sitting, BP Cuff Size: Adult)   Pulse 86   Temp 36.4 °C (97.5 °F) (Temporal)   Wt (!) 155 kg (340 lb 11.2 oz)   SpO2 96%   BMI 46.21 kg/m²   Vital signs and pulse oximetry reviewed.     Physical Exam  ***    ASSESSMENT & PLAN     Problem List Items Addressed This Visit    None      ***    Follow-Up Recommendations: ***    Please excuse any typos or grammatical errors, part of this note was constructed with Dragon dictation software.    Loyda Casillas DO, Kaylie  [facility]    Office: [phone]  1/28/2025 2:13 PM     order CT orbit with IV contrast.  Ideally would get this scheduled this week.  However if it does not get scheduled this week, if patient is overall improving with his oral antibiotics, then we can cancel that CT in the future.  -I did advise patient to keep his appointment with his eye doctor on Thursday which is in 2 days.  -I will see the patient back next week in the office to check resolution or extend antibiotic course.         Relevant Orders    CBC and Auto Differential    Comprehensive Metabolic Panel    Sedimentation Rate    C-reactive protein    Follow Up In [facility] - PCP - Established    CT orbit w IV contrast       Level 4    Follow-Up Recommendations: 1wk    Please excuse any typos or grammatical errors, part of this note was constructed with Dragon dictation software.    Loyda Casillas DO, Kaylie  [facility]   Office: [phone]  1/28/2025 10:46 PM

## 2025-02-04 ENCOUNTER — PATIENT MESSAGE (OUTPATIENT)
Dept: PRIMARY CARE | Facility: CLINIC | Age: 35
End: 2025-02-04

## 2025-02-04 ENCOUNTER — APPOINTMENT (OUTPATIENT)
Dept: PRIMARY CARE | Facility: CLINIC | Age: 35
End: 2025-02-04
Payer: COMMERCIAL

## 2025-02-04 DIAGNOSIS — L03.213 PERIORBITAL CELLULITIS OF RIGHT EYE: ICD-10-CM

## 2025-02-04 RX ORDER — SULFAMETHOXAZOLE AND TRIMETHOPRIM 800; 160 MG/1; MG/1
1 TABLET ORAL 2 TIMES DAILY
Qty: 14 TABLET | Refills: 0 | Status: SHIPPED | OUTPATIENT
Start: 2025-02-04 | End: 2025-02-11

## 2025-02-04 RX ORDER — AMOXICILLIN AND CLAVULANATE POTASSIUM 875; 125 MG/1; MG/1
875 TABLET, FILM COATED ORAL 2 TIMES DAILY
Qty: 14 TABLET | Refills: 0 | Status: SHIPPED | OUTPATIENT
Start: 2025-02-04 | End: 2025-02-11

## 2025-02-05 NOTE — PATIENT COMMUNICATION
BRIEF NOTE    Subjective/Objective:  Cellulitis much improved, but still present. Pictures in chart.    Assessment/Plan:  1. Periorbital cellulitis of right eye  - Will extend course another 7 days, for total of 14 days.  - amoxicillin-pot clavulanate (Augmentin) 875-125 mg tablet; Take 1 tablet (875 mg) by mouth 2 times a day for 7 days.  Dispense: 14 tablet; Refill: 0  - sulfamethoxazole-trimethoprim (Bactrim DS) 800-160 mg tablet; Take 1 tablet by mouth 2 times a day for 7 days.  Dispense: 14 tablet; Refill: 0     No problem-specific Assessment & Plan notes found for this encounter.        Loyda Casillas DO, Kaylie  [facility]  Office: [phone]  2/4/2025 7:13 PM

## 2025-02-13 ENCOUNTER — APPOINTMENT (OUTPATIENT)
Dept: RADIOLOGY | Facility: CLINIC | Age: 35
End: 2025-02-13
Payer: COMMERCIAL

## 2025-02-13 PROBLEM — L03.213 PERIORBITAL CELLULITIS OF RIGHT EYE: Status: ACTIVE | Noted: 2025-02-13

## 2025-02-13 PROBLEM — L03.213 PERIORBITAL CELLULITIS OF RIGHT EYE: Status: ACTIVE | Noted: 2025-01-28

## 2025-02-14 NOTE — ASSESSMENT & PLAN NOTE
Patient with periorbital cellulitis of his right eye.  He incidentally actually has an eye appointment scheduled with his ophthalmologist this week in 2 days.  -Given the fast progression of how quickly this became swollen, we will treat him aggressively and make sure we have MRSA coverage.  -I did send in Augmentin twice daily x 7 days plus Bactrim twice daily x 7 days.  -Additionally I would like to get lab work to rule out systemic infection.  -I will also order CT orbit with IV contrast.  Ideally would get this scheduled this week.  However if it does not get scheduled this week, if patient is overall improving with his oral antibiotics, then we can cancel that CT in the future.  -I did advise patient to keep his appointment with his eye doctor on Thursday which is in 2 days.  -I will see the patient back next week in the office to check resolution or extend antibiotic course.

## 2025-02-25 ENCOUNTER — PATIENT MESSAGE (OUTPATIENT)
Dept: PRIMARY CARE | Facility: CLINIC | Age: 35
End: 2025-02-25
Payer: COMMERCIAL

## 2025-02-25 DIAGNOSIS — R11.12 PROJECTILE VOMITING WITH NAUSEA: Primary | ICD-10-CM

## 2025-02-25 RX ORDER — ONDANSETRON 4 MG/1
8 TABLET, FILM COATED ORAL EVERY 8 HOURS PRN
Qty: 20 TABLET | Refills: 0 | Status: SHIPPED | OUTPATIENT
Start: 2025-02-25 | End: 2025-03-04

## 2025-02-25 NOTE — PATIENT COMMUNICATION
BRIEF NOTE    Subjective/Objective:  GI bug currently.     Assessment/Plan:  1. Projectile vomiting with nausea (Primary)  - ondansetron (Zofran) 4 mg tablet; Take 2 tablets (8 mg) by mouth every 8 hours if needed for nausea or vomiting for up to 7 days.  Dispense: 20 tablet; Refill: 0      No problem-specific Assessment & Plan notes found for this encounter.        Loyda Casillas DO, Kaylie  [facility]  Office: [phone]  2/25/2025 5:50 PM

## 2025-03-10 ENCOUNTER — PATIENT MESSAGE (OUTPATIENT)
Dept: PRIMARY CARE | Facility: CLINIC | Age: 35
End: 2025-03-10
Payer: COMMERCIAL

## 2025-03-18 DIAGNOSIS — F41.9 ANXIETY DISORDER, UNSPECIFIED: ICD-10-CM

## 2025-03-19 ENCOUNTER — PATIENT MESSAGE (OUTPATIENT)
Dept: PRIMARY CARE | Facility: CLINIC | Age: 35
End: 2025-03-19
Payer: COMMERCIAL

## 2025-03-19 DIAGNOSIS — F41.1 GENERALIZED ANXIETY DISORDER: Primary | ICD-10-CM

## 2025-03-19 RX ORDER — LORAZEPAM 0.5 MG/1
0.5 TABLET ORAL DAILY PRN
Qty: 7 TABLET | Refills: 0 | OUTPATIENT
Start: 2025-03-19

## 2025-03-19 NOTE — TELEPHONE ENCOUNTER
BRIEF NOTE    Subjective/Objective:  Pharmacy refill request for Ativan. Patient reported he took the Ativan about once per year when at his last appt. Last fill was in 2020. No record of Ativan in PDMP.     Assessment/Plan:  1. Anxiety disorder, unspecified  - Patient needs to call the office for Ativan.       No problem-specific Assessment & Plan notes found for this encounter.        Loyda Casillas DO, Kaylie  [facility]  Office: [phone]  3/19/2025 6:53 PM

## 2025-03-27 RX ORDER — HYDROXYZINE HYDROCHLORIDE 25 MG/1
25 TABLET, FILM COATED ORAL 2 TIMES DAILY PRN
Qty: 60 TABLET | Refills: 0 | Status: SHIPPED | OUTPATIENT
Start: 2025-03-27

## 2025-03-28 NOTE — PATIENT COMMUNICATION
BRIEF NOTE    Subjective/Objective:  Patient request for panic attack medication.     Assessment/Plan:  1. Generalized anxiety disorder (Primary)  - hydrOXYzine HCL (Atarax) 25 mg tablet; Take 1 tablet (25 mg) by mouth 2 times a day as needed for anxiety.  Dispense: 60 tablet; Refill: 0      No problem-specific Assessment & Plan notes found for this encounter.        Loyda Casillas DO, MSEd  [facility]  Office: [phone]  3/27/2025 8:58 PM

## 2025-05-04 DIAGNOSIS — F41.1 GENERALIZED ANXIETY DISORDER: ICD-10-CM

## 2025-05-08 RX ORDER — VENLAFAXINE HYDROCHLORIDE 150 MG/1
150 CAPSULE, EXTENDED RELEASE ORAL DAILY
Qty: 90 CAPSULE | Refills: 3 | Status: SHIPPED | OUTPATIENT
Start: 2025-05-08

## 2025-05-09 NOTE — TELEPHONE ENCOUNTER
BRIEF NOTE    Subjective/Objective:  Patient called office requesting refill.     Assessment/Plan:  1. Generalized anxiety disorder  - venlafaxine XR (Effexor-XR) 150 mg 24 hr capsule; Take 1 capsule (150 mg) by mouth once daily.  Dispense: 90 capsule; Refill: 3      No problem-specific Assessment & Plan notes found for this encounter.        Loyda Casillas DO, MSEd  [facility]  Office: [phone]  5/8/2025 9:09 PM

## 2025-05-19 ENCOUNTER — APPOINTMENT (OUTPATIENT)
Dept: PRIMARY CARE | Facility: CLINIC | Age: 35
End: 2025-05-19
Payer: COMMERCIAL

## 2025-05-19 VITALS
HEIGHT: 72 IN | DIASTOLIC BLOOD PRESSURE: 74 MMHG | WEIGHT: 315 LBS | HEART RATE: 95 BPM | OXYGEN SATURATION: 95 % | TEMPERATURE: 97.1 F | BODY MASS INDEX: 42.66 KG/M2 | RESPIRATION RATE: 14 BRPM | SYSTOLIC BLOOD PRESSURE: 110 MMHG

## 2025-05-19 DIAGNOSIS — K76.0 FATTY LIVER: ICD-10-CM

## 2025-05-19 DIAGNOSIS — Z00.00 WELL ADULT EXAM: ICD-10-CM

## 2025-05-19 DIAGNOSIS — F41.1 GENERALIZED ANXIETY DISORDER: Primary | ICD-10-CM

## 2025-05-19 DIAGNOSIS — D16.31 OSTEOCHONDROMA OF RIGHT FOOT: ICD-10-CM

## 2025-05-19 PROBLEM — G56.00 CARPAL TUNNEL SYNDROME: Status: ACTIVE | Noted: 2023-03-03

## 2025-05-19 PROCEDURE — 3008F BODY MASS INDEX DOCD: CPT | Performed by: STUDENT IN AN ORGANIZED HEALTH CARE EDUCATION/TRAINING PROGRAM

## 2025-05-19 PROCEDURE — 99215 OFFICE O/P EST HI 40 MIN: CPT | Performed by: STUDENT IN AN ORGANIZED HEALTH CARE EDUCATION/TRAINING PROGRAM

## 2025-05-19 RX ORDER — BUSPIRONE HYDROCHLORIDE 10 MG/1
10 TABLET ORAL 2 TIMES DAILY
Qty: 60 TABLET | Refills: 1 | Status: SHIPPED | OUTPATIENT
Start: 2025-05-19

## 2025-05-19 RX ORDER — VENLAFAXINE HYDROCHLORIDE 150 MG/1
150 CAPSULE, EXTENDED RELEASE ORAL DAILY
Qty: 90 CAPSULE | Refills: 3 | Status: SHIPPED | OUTPATIENT
Start: 2025-05-19

## 2025-05-19 ASSESSMENT — PATIENT HEALTH QUESTIONNAIRE - PHQ9
SUM OF ALL RESPONSES TO PHQ QUESTIONS 1-9: 5
10. IF YOU CHECKED OFF ANY PROBLEMS, HOW DIFFICULT HAVE THESE PROBLEMS MADE IT FOR YOU TO DO YOUR WORK, TAKE CARE OF THINGS AT HOME, OR GET ALONG WITH OTHER PEOPLE: NOT DIFFICULT AT ALL
1. LITTLE INTEREST OR PLEASURE IN DOING THINGS: NOT AT ALL
5. POOR APPETITE OR OVEREATING: NOT AT ALL
8. MOVING OR SPEAKING SO SLOWLY THAT OTHER PEOPLE COULD HAVE NOTICED. OR THE OPPOSITE, BEING SO FIGETY OR RESTLESS THAT YOU HAVE BEEN MOVING AROUND A LOT MORE THAN USUAL: NOT AT ALL
6. FEELING BAD ABOUT YOURSELF - OR THAT YOU ARE A FAILURE OR HAVE LET YOURSELF OR YOUR FAMILY DOWN: SEVERAL DAYS
SUM OF ALL RESPONSES TO PHQ9 QUESTIONS 1 AND 2: 1
7. TROUBLE CONCENTRATING ON THINGS, SUCH AS READING THE NEWSPAPER OR WATCHING TELEVISION: NOT AT ALL
2. FEELING DOWN, DEPRESSED OR HOPELESS: SEVERAL DAYS
3. TROUBLE FALLING OR STAYING ASLEEP OR SLEEPING TOO MUCH: MORE THAN HALF THE DAYS
4. FEELING TIRED OR HAVING LITTLE ENERGY: SEVERAL DAYS
9. THOUGHTS THAT YOU WOULD BE BETTER OFF DEAD, OR OF HURTING YOURSELF: NOT AT ALL

## 2025-05-19 ASSESSMENT — ANXIETY QUESTIONNAIRES
4. TROUBLE RELAXING: NOT AT ALL
IF YOU CHECKED OFF ANY PROBLEMS ON THIS QUESTIONNAIRE, HOW DIFFICULT HAVE THESE PROBLEMS MADE IT FOR YOU TO DO YOUR WORK, TAKE CARE OF THINGS AT HOME, OR GET ALONG WITH OTHER PEOPLE: NOT DIFFICULT AT ALL
6. BECOMING EASILY ANNOYED OR IRRITABLE: SEVERAL DAYS
5. BEING SO RESTLESS THAT IT IS HARD TO SIT STILL: NOT AT ALL
3. WORRYING TOO MUCH ABOUT DIFFERENT THINGS: SEVERAL DAYS
1. FEELING NERVOUS, ANXIOUS, OR ON EDGE: SEVERAL DAYS
GAD7 TOTAL SCORE: 5
7. FEELING AFRAID AS IF SOMETHING AWFUL MIGHT HAPPEN: SEVERAL DAYS
2. NOT BEING ABLE TO STOP OR CONTROL WORRYING: SEVERAL DAYS

## 2025-05-19 NOTE — PROGRESS NOTES
FAMILY MEDICINE  OFFICE VISIT   Ministerio Cohn  [id number]  1990    PCP: Loyda Casillas DO     Chief Complaint:   Chief Complaint   Patient presents with    Annual Exam     No concerns     SUBJECTIVE     Ministerio Cohn is a 35 y.o. English-speaking male with pertinent PMHx of anx/dep, who presents to the clinic with complaints of follow-up.    History of Present Illness  Ministerio Cohn is a 35 year old male with osteochondroma who presents with concerns about its potential malignancy.    He has a history of osteochondroma located on the medial margin of the fourth metatarsal shaft, identified following a foot fracture last Easter. Imaging, including x-rays and an MRI, confirmed the presence of the osteochondroma and a stress fracture, which has since healed. There have been no changes in pain or irritation since last year.    He has a significant family history of cancer, including breast, uterine, colon, prostate, lymphoma, and pancreatic cancers among his mother, father, grandparents, and aunts. This family history contributes to his anxiety about the osteochondroma and potential cancer risk.    He is currently taking Effexor for anxiety, which he finds effective, though he experiences periods of increased anxiety related to life stressors. He has previously taken buspirone and lorazepam for anxiety, with buspirone being well-tolerated. Hydroxyzine has been prescribed but not yet used.    He has a history of elevated liver enzymes, which have since decreased. He is currently unemployed and seeking a job. He has a history of alcohol consumption, which he has reduced. No new or worsening pain or irritation in the foot since last year.    - PHQ9 5   - CHAITANYA 5     HPI      The following portions of the patient's chart were reviewed in this encounter and updated as appropriate:         Home Medication List:  Current Outpatient Medications   Medication Instructions    hydrOXYzine HCL (ATARAX) 25 mg,  oral, 2 times daily PRN    venlafaxine XR (EFFEXOR-XR) 150 mg, oral, Daily         OBJECTIVE   /74 (BP Location: Right arm, Patient Position: Sitting, BP Cuff Size: Large adult)   Pulse 95   Temp 36.2 °C (97.1 °F) (Temporal)   Resp 14   Ht 1.829 m (6')   Wt (!) 152 kg (335 lb 11.2 oz)   SpO2 95%   BMI 45.53 kg/m²   Vital signs and pulse oximetry reviewed.     Physical Exam    Physical Exam      Results  LABS  LFTs: AST 72 (11/2024)    RADIOLOGY  Foot X-ray: Osteochondroma on the medial margin of the fourth metatarsal shaft (04/2024)  Foot MRI: Healed hairline fracture (04/2024)    ***    ASSESSMENT & PLAN     Problem List Items Addressed This Visit       Anxiety disorder     Other Visit Diagnoses         Well adult exam                Assessment & Plan  Osteochondroma of foot  Osteochondroma on the medial margin of the fourth metatarsal shaft remains unchanged in size or symptoms. Low malignancy risk, but patient concerned due to family cancer history. Previous orthopedic evaluation advised observation.  - Order repeat x-ray to assess for changes in osteochondroma size or characteristics.  - Continue observation as per previous orthopedic recommendation.    Depression  Effexor dose well-tolerated, but intermittent anxiety and paranoia persist. Previous Effexor increase caused GI discomfort. Buspirone previously effective. Discussed hydroxyzine for anxiety, not yet used. Considering restarting buspirone.  - Prescribe buspirone once or twice daily as needed for anxiety.  - Continue current dose of Effexor.  - Discuss potential use of hydroxyzine as an alternative to lorazepam for anxiety management.    Fatty liver disease  Previous elevated liver enzymes likely due to fatty liver, possibly related to obesity. Liver enzymes decreased, indicating possible improvement. Discussed reversible nature with lifestyle changes and risk of cirrhosis if unmanaged.  - Order repeat liver function tests in the fall to  monitor liver enzyme levels.  - Encourage lifestyle modifications to reduce liver fat, including diet and exercise.      ***    Follow-Up Recommendations: ***    Please excuse any typos or grammatical errors, part of this note was constructed with Dragon dictation software.    This medical note was created with the assistance of artificial intelligence (AI) for documentation purposes. The content has been reviewed and confirmed by the healthcare provider for accuracy and completeness. Patient consented to the use of audio recording and use of AI during their visit.         Loyda Casillas DO, Kaylie  [facility]   Office: [phone]  5/19/2025 4:41 PM       observation as per previous orthopedic recommendation.         Relevant Orders    XR foot right 3+ views     Other Visit Diagnoses         Well adult exam        Relevant Orders    Lipid Panel    Hemoglobin A1C    Comprehensive Metabolic Panel    CBC and Auto Differential    TSH with reflex to Free T4 if abnormal    Follow Up In [facility] - PCP - Health Maintenance    Vitamin B12    Vitamin D 25-Hydroxy,Total (for eval of Vitamin D levels)            Assessment & Plan      Total time spent caring for the patient today was 47 minutes. This includes time spent before the visit reviewing the chart, time spent during the visit discussing symptoms and developing a plan of care for this patient, as well as documentation.     Level 5    Follow-Up Recommendations: 6mo    Please excuse any typos or grammatical errors, part of this note was constructed with Dragon dictation software.    This medical note was created with the assistance of artificial intelligence (AI) for documentation purposes. The content has been reviewed and confirmed by the healthcare provider for accuracy and completeness. Patient consented to the use of audio recording and use of AI during their visit.         Loyda Casillas DO, MAURICIOd  [facility]   Office: [phone]  6/4/2025 5:29 PM

## 2025-05-19 NOTE — PATIENT INSTRUCTIONS
VISIT SUMMARY:  Today, we discussed your concerns about the osteochondroma on your foot, your ongoing anxiety and depression, and your history of elevated liver enzymes. We reviewed your current medications and family history of cancer, and we made plans for further monitoring and management of your conditions.    YOUR PLAN:  -OSTEOCHONDROMA OF FOOT: Osteochondroma is a benign bone growth. Your osteochondroma on the fourth metatarsal shaft has not changed in size or symptoms. We will order a repeat x-ray to check for any changes and continue to observe it as previously recommended by the orthopedic specialist.    -DEPRESSION: Depression is a mood disorder that causes persistent feelings of sadness and loss of interest. Your current dose of Effexor is well-tolerated, but you still experience intermittent anxiety. We will prescribe buspirone to take once or twice daily as needed for anxiety, continue your current dose of Effexor, and discuss using hydroxyzine as an alternative to lorazepam for anxiety management.    -FATTY LIVER DISEASE: Fatty liver disease occurs when fat builds up in the liver. Your liver enzymes have decreased, which is a good sign. We will order repeat liver function tests in the fall to monitor your liver enzyme levels. It's important to continue with lifestyle changes, including a healthy diet and regular exercise, to reduce liver fat and prevent further complications.    INSTRUCTIONS:  Please schedule a follow-up appointment for a repeat x-ray of your foot and liver function tests in the fall. Continue taking Effexor as prescribed and start taking buspirone as needed for anxiety. Consider using hydroxyzine if you need an alternative to lorazepam. Maintain a healthy diet and exercise regularly to support your liver health.

## 2025-06-04 PROBLEM — D16.31: Status: ACTIVE | Noted: 2025-06-04

## 2025-06-04 PROBLEM — K76.0 FATTY LIVER: Status: ACTIVE | Noted: 2025-06-04

## 2025-06-04 NOTE — ASSESSMENT & PLAN NOTE
Depression  Effexor dose well-tolerated, but intermittent anxiety and paranoia persist. Previous Effexor increase caused GI discomfort. Buspirone previously effective. Discussed hydroxyzine for anxiety, not yet used. Considering restarting buspirone.  - Prescribe buspirone once or twice daily as needed for anxiety.  - Continue current dose of Effexor.  - Discuss potential use of hydroxyzine as an alternative to lorazepam for anxiety management.

## 2025-06-04 NOTE — ASSESSMENT & PLAN NOTE
Osteochondroma of foot  Osteochondroma on the medial margin of the fourth metatarsal shaft remains unchanged in size or symptoms. Low malignancy risk, but patient concerned due to family cancer history. Previous orthopedic evaluation advised observation.  - Order repeat x-ray to assess for changes in osteochondroma size or characteristics.  - Continue observation as per previous orthopedic recommendation.

## 2025-06-04 NOTE — ASSESSMENT & PLAN NOTE
Suspected Fatty liver disease  Previous elevated liver enzymes likely due to fatty liver, possibly related to obesity. Liver enzymes decreased, indicating possible improvement. Discussed reversible nature with lifestyle changes and risk of cirrhosis if unmanaged.  - Order repeat liver function tests in the fall to monitor liver enzyme levels.  - Encourage lifestyle modifications to reduce liver fat, including diet and exercise.

## 2025-11-24 ENCOUNTER — APPOINTMENT (OUTPATIENT)
Dept: PRIMARY CARE | Facility: CLINIC | Age: 35
End: 2025-11-24
Payer: COMMERCIAL